# Patient Record
Sex: FEMALE | Race: BLACK OR AFRICAN AMERICAN | URBAN - METROPOLITAN AREA
[De-identification: names, ages, dates, MRNs, and addresses within clinical notes are randomized per-mention and may not be internally consistent; named-entity substitution may affect disease eponyms.]

---

## 2021-07-15 ENCOUNTER — OFFICE VISIT (OUTPATIENT)
Dept: URBAN - METROPOLITAN AREA CLINIC 105 | Facility: CLINIC | Age: 61
End: 2021-07-15
Payer: MEDICARE

## 2021-07-15 ENCOUNTER — WEB ENCOUNTER (OUTPATIENT)
Dept: URBAN - METROPOLITAN AREA CLINIC 105 | Facility: CLINIC | Age: 61
End: 2021-07-15

## 2021-07-15 VITALS
SYSTOLIC BLOOD PRESSURE: 125 MMHG | BODY MASS INDEX: 37.95 KG/M2 | HEIGHT: 63 IN | TEMPERATURE: 97.9 F | HEART RATE: 90 BPM | DIASTOLIC BLOOD PRESSURE: 81 MMHG | WEIGHT: 214.2 LBS

## 2021-07-15 DIAGNOSIS — R93.89 ABNORMAL ULTRASOUND: ICD-10-CM

## 2021-07-15 DIAGNOSIS — R74.01 ELEVATED TRANSAMINASE LEVEL: ICD-10-CM

## 2021-07-15 PROCEDURE — 99214 OFFICE O/P EST MOD 30 MIN: CPT | Performed by: INTERNAL MEDICINE

## 2021-07-15 RX ORDER — POTASSIUM CHLORIDE 1500 MG/1
TABLET, EXTENDED RELEASE ORAL
Qty: 180 | Status: ACTIVE | COMMUNITY

## 2021-07-15 RX ORDER — MECLIZINE HYDROCHLORIDE 12.5 MG/1
TABLET ORAL
Qty: 30 | Status: ACTIVE | COMMUNITY

## 2021-07-15 RX ORDER — HYDROMORPHONE HYDROCHLORIDE 2 MG/1
TABLET ORAL
Qty: 0 | Refills: 0 | Status: ACTIVE | COMMUNITY
Start: 1900-01-01

## 2021-07-15 RX ORDER — ZOLPIDEM TARTRATE 10 MG/1
TABLET ORAL
Qty: 90 | Status: ON HOLD | COMMUNITY

## 2021-07-15 RX ORDER — TOPIRAMATE 50 MG/1
TABLET, FILM COATED ORAL
Qty: 87 | Status: ON HOLD | COMMUNITY

## 2021-07-15 RX ORDER — METHOCARBAMOL TABLETS 500 MG/1
TABLET, COATED ORAL
Qty: 60 | Status: ON HOLD | COMMUNITY

## 2021-07-15 RX ORDER — POTASSIUM CHLORIDE 1.5 G/1.58G
POWDER, FOR SOLUTION ORAL
Qty: 0 | Refills: 0 | Status: ACTIVE | COMMUNITY
Start: 1900-01-01

## 2021-07-15 RX ORDER — NITROFURANTOIN 25; 75 MG/1; MG/1
CAPSULE ORAL
Qty: 6 | Status: ACTIVE | COMMUNITY

## 2021-07-15 RX ORDER — AZITHROMYCIN 250 MG/1
TABLET, FILM COATED ORAL
Qty: 6 | Status: ON HOLD | COMMUNITY

## 2021-07-15 RX ORDER — LISINOPRIL AND HYDROCHLOROTHIAZIDE 25; 20 MG/1; MG/1
TABLET ORAL
Qty: 90 | Status: ACTIVE | COMMUNITY

## 2021-07-15 RX ORDER — TRAMADOL HYDROCHLORIDE 100 MG/1
TABLET, EXTENDED RELEASE ORAL
Qty: 30 | Status: ACTIVE | COMMUNITY

## 2021-07-15 RX ORDER — MELOXICAM 15 MG/1
TABLET ORAL
Qty: 90 | Status: ACTIVE | COMMUNITY

## 2021-07-15 RX ORDER — ALBUTEROL SULFATE 108 UG/1
INHALANT RESPIRATORY (INHALATION)
Qty: 13.4 | Status: ON HOLD | COMMUNITY

## 2021-07-15 RX ORDER — METHYLPREDNISOLONE 4 MG/1
TABLET ORAL
Qty: 21 | Status: ACTIVE | COMMUNITY

## 2021-07-15 RX ORDER — HYDROCODONE BITARTRATE AND ACETAMINOPHEN 7.5; 325 MG/1; MG/1
TABLET ORAL
Qty: 60 | Status: ACTIVE | COMMUNITY

## 2021-07-15 RX ORDER — LISINOPRIL 20 MG/1
1 TABLET TABLET ORAL ONCE A DAY
Status: ACTIVE | COMMUNITY

## 2021-07-15 RX ORDER — TAMOXIFEN CITRATE 20 MG/1
TABLET, FILM COATED ORAL
Qty: 0 | Refills: 0 | Status: ACTIVE | COMMUNITY
Start: 1900-01-01

## 2021-07-15 RX ORDER — LINACLOTIDE 72 UG/1
CAPSULE, GELATIN COATED ORAL
Qty: 90 | Status: ON HOLD | COMMUNITY

## 2021-07-15 RX ORDER — SUVOREXANT 20 MG/1
TABLET, FILM COATED ORAL
Qty: 0 | Refills: 0 | Status: ACTIVE | COMMUNITY
Start: 1900-01-01

## 2021-07-15 RX ORDER — FUROSEMIDE 20 MG/1
TABLET ORAL
Qty: 0 | Refills: 0 | Status: ACTIVE | COMMUNITY
Start: 1900-01-01

## 2021-07-15 RX ORDER — CYCLOBENZAPRINE HYDROCHLORIDE 10 MG/1
TABLET, FILM COATED ORAL
Qty: 90 | Status: ACTIVE | COMMUNITY

## 2021-07-15 NOTE — HPI-TODAY'S VISIT:
The patient presents on follow-up for abnormal imaging. She was previously seen by Charlette Busby MD on 3/12/20.  Today, the patient says she could not get an appt with her established GI, Dr. Charlette Busby, until September. She presents for an abnormal finding on a recent X-ray (done at [facility], [address]). She does not drink alcohol. She started on diet pills 2 weeks ago, but no other new meds. She has been taking vitamin A, D, and C supplementation from when she had COVID, but no other special supplement of drinks. She thought her elevated transaminases were the result of taking Meloxicam BID, tramadol BID, and Loritab BID - now takes Loritab BID while Meloxicam and tramadol use are both 1x/couple weeks. She notes an occasional "squeezing" sensation in the RUQ that occurs 3-4x/month and lasts 5-6 min and is exacerbated by moving in certain positions.   She does not have metal in her body and does not have significant claustrophobia.  Labs 5/11/21 - AST 97, ALT 77. Acute hep panel negative.  4/7/21 - AST 47, ALT 45. 2/2/21 - COVID positive. 2/10/20 - CBC normal except MCV 78.2. HDL 37, triglycerides 280. Hep C Ab negative. CMP, TSH all normal.  9/13/19 - Stool study positive for C. diff (EIA positive). Fecal fat, pancreatic elastase all negative. 9/5/19 - CBC normal except MCV 78, platelets 469. CMP normal except sodium 145, potassium 3.2. Celiac panel negative.

## 2021-07-23 LAB
A/G RATIO: 1.8
ACTIN (SMOOTH MUSCLE) ANTIBODY: 9
ALBUMIN: 4.3
ALKALINE PHOSPHATASE: 102
ALT (SGPT): 34
ANA DIRECT: NEGATIVE
AST (SGOT): 25
BASO (ABSOLUTE): 0.1
BASOS: 1
BILIRUBIN, TOTAL: 0.4
BUN/CREATININE RATIO: 20
BUN: 17
CALCIUM: 9.9
CARBON DIOXIDE, TOTAL: 28
CHLORIDE: 99
CHOLESTEROL, TOTAL: 216
COMMENT:: (no result)
CREATINE KINASE,TOTAL: 114
CREATININE: 0.87
EGFR IF AFRICN AM: 85
EGFR IF NONAFRICN AM: 74
EOS (ABSOLUTE): 0.1
EOS: 1
FERRITIN, SERUM: 209
GLOBULIN, TOTAL: 2.4
GLUCOSE: 125
HBSAG SCREEN: NEGATIVE
HDL CHOLESTEROL: 52
HEMATOCRIT: 39.5
HEMATOLOGY COMMENTS:: (no result)
HEMOGLOBIN: 12.6
HEP A AB, IGM: NEGATIVE
HEP B CORE AB, IGM: NEGATIVE
HEP C VIRUS AB: <0.1
IGG, IMMUNOGLOBULIN G (RDL): 713
IMMATURE CELLS: (no result)
IMMATURE GRANS (ABS): 0
IMMATURE GRANULOCYTES: 0
IMMUNOGLOBULIN A, QN, SERUM: 206
IRON BIND.CAP.(TIBC): 353
IRON SATURATION: 19
IRON: 68
LDL CHOL CALC (NIH): 140
LIVER-KIDNEY MICROSOMAL AB: 0.6
LYMPHS (ABSOLUTE): 3.3
LYMPHS: 38
MCH: 26.5
MCHC: 31.9
MCV: 83
MITOCHONDRIAL (M2) ANTIBODY: <20
MONOCYTES(ABSOLUTE): 0.5
MONOCYTES: 6
NEUTROPHILS (ABSOLUTE): 4.8
NEUTROPHILS: 54
NRBC: (no result)
PLATELETS: 333
POTASSIUM: 3.5
PROTEIN, TOTAL: 6.7
RBC: 4.75
RDW: 14.5
SODIUM: 142
T-TRANSGLUTAMINASE (TTG) IGA: <2
T4,FREE(DIRECT): 1.22
TRIGLYCERIDES: 135
TSH: 1.18
UIBC: 285
VLDL CHOLESTEROL CAL: 24
WBC: 8.9

## 2021-09-02 ENCOUNTER — OFFICE VISIT (OUTPATIENT)
Dept: URBAN - METROPOLITAN AREA CLINIC 17 | Facility: CLINIC | Age: 61
End: 2021-09-02

## 2021-09-02 RX ORDER — MECLIZINE HYDROCHLORIDE 12.5 MG/1
TABLET ORAL
Qty: 30 | Status: ACTIVE | COMMUNITY

## 2021-09-02 RX ORDER — AZITHROMYCIN 250 MG/1
TABLET, FILM COATED ORAL
Qty: 6 | COMMUNITY

## 2021-09-02 RX ORDER — METHYLPREDNISOLONE 4 MG/1
TABLET ORAL
Qty: 21 | Status: ACTIVE | COMMUNITY

## 2021-09-02 RX ORDER — LINACLOTIDE 72 UG/1
CAPSULE, GELATIN COATED ORAL
Qty: 90 | COMMUNITY

## 2021-09-02 RX ORDER — TRAMADOL HYDROCHLORIDE 100 MG/1
TABLET, EXTENDED RELEASE ORAL
Qty: 30 | Status: ACTIVE | COMMUNITY

## 2021-09-02 RX ORDER — ALBUTEROL SULFATE 108 UG/1
INHALANT RESPIRATORY (INHALATION)
Qty: 13.4 | COMMUNITY

## 2021-09-02 RX ORDER — HYDROCODONE BITARTRATE AND ACETAMINOPHEN 7.5; 325 MG/1; MG/1
TABLET ORAL
Qty: 60 | Status: ACTIVE | COMMUNITY

## 2021-09-02 RX ORDER — CYCLOBENZAPRINE HYDROCHLORIDE 10 MG/1
TABLET, FILM COATED ORAL
Qty: 90 | Status: ACTIVE | COMMUNITY

## 2021-09-02 RX ORDER — TAMOXIFEN CITRATE 20 MG/1
TABLET, FILM COATED ORAL
Qty: 0 | Refills: 0 | Status: ACTIVE | COMMUNITY
Start: 1900-01-01

## 2021-09-02 RX ORDER — LISINOPRIL AND HYDROCHLOROTHIAZIDE 25; 20 MG/1; MG/1
TABLET ORAL
Qty: 90 | Status: ACTIVE | COMMUNITY

## 2021-09-02 RX ORDER — FUROSEMIDE 20 MG/1
TABLET ORAL
Qty: 0 | Refills: 0 | Status: ACTIVE | COMMUNITY
Start: 1900-01-01

## 2021-09-02 RX ORDER — NITROFURANTOIN 25; 75 MG/1; MG/1
CAPSULE ORAL
Qty: 6 | Status: ACTIVE | COMMUNITY

## 2021-09-02 RX ORDER — METHOCARBAMOL TABLETS 500 MG/1
TABLET, COATED ORAL
Qty: 60 | COMMUNITY

## 2021-09-02 RX ORDER — POTASSIUM CHLORIDE 1500 MG/1
TABLET, EXTENDED RELEASE ORAL
Qty: 180 | Status: ACTIVE | COMMUNITY

## 2021-09-02 RX ORDER — SUVOREXANT 20 MG/1
TABLET, FILM COATED ORAL
Qty: 0 | Refills: 0 | Status: ACTIVE | COMMUNITY
Start: 1900-01-01

## 2021-09-02 RX ORDER — TOPIRAMATE 50 MG/1
TABLET, FILM COATED ORAL
Qty: 87 | COMMUNITY

## 2021-09-02 RX ORDER — MELOXICAM 15 MG/1
TABLET ORAL
Qty: 90 | Status: ACTIVE | COMMUNITY

## 2021-09-02 RX ORDER — ZOLPIDEM TARTRATE 10 MG/1
TABLET ORAL
Qty: 90 | COMMUNITY

## 2021-09-02 RX ORDER — POTASSIUM CHLORIDE 1.5 G/1.58G
POWDER, FOR SOLUTION ORAL
Qty: 0 | Refills: 0 | Status: ACTIVE | COMMUNITY
Start: 1900-01-01

## 2021-09-02 RX ORDER — LISINOPRIL 20 MG/1
1 TABLET TABLET ORAL ONCE A DAY
Status: ACTIVE | COMMUNITY

## 2021-09-02 RX ORDER — HYDROMORPHONE HYDROCHLORIDE 2 MG/1
TABLET ORAL
Qty: 0 | Refills: 0 | Status: ACTIVE | COMMUNITY
Start: 1900-01-01

## 2022-02-23 ENCOUNTER — DASHBOARD ENCOUNTERS (OUTPATIENT)
Age: 62
End: 2022-02-23

## 2022-02-23 ENCOUNTER — OFFICE VISIT (OUTPATIENT)
Dept: URBAN - METROPOLITAN AREA CLINIC 105 | Facility: CLINIC | Age: 62
End: 2022-02-23
Payer: MEDICARE

## 2022-02-23 VITALS
DIASTOLIC BLOOD PRESSURE: 84 MMHG | HEIGHT: 63 IN | SYSTOLIC BLOOD PRESSURE: 137 MMHG | HEART RATE: 73 BPM | WEIGHT: 225 LBS | TEMPERATURE: 97.4 F | BODY MASS INDEX: 39.87 KG/M2

## 2022-02-23 DIAGNOSIS — R14.0 BLOATING: ICD-10-CM

## 2022-02-23 DIAGNOSIS — R74.01 ELEVATED TRANSAMINASE LEVEL: ICD-10-CM

## 2022-02-23 DIAGNOSIS — R14.3 EXCESSIVE GAS: ICD-10-CM

## 2022-02-23 DIAGNOSIS — R93.89 ABNORMAL ULTRASOUND: ICD-10-CM

## 2022-02-23 PROCEDURE — 99214 OFFICE O/P EST MOD 30 MIN: CPT | Performed by: INTERNAL MEDICINE

## 2022-02-23 RX ORDER — FUROSEMIDE 20 MG/1
TABLET ORAL
Qty: 0 | Refills: 0 | Status: ACTIVE | COMMUNITY
Start: 1900-01-01

## 2022-02-23 RX ORDER — LISINOPRIL AND HYDROCHLOROTHIAZIDE 25; 20 MG/1; MG/1
TABLET ORAL
Qty: 90 | Status: ACTIVE | COMMUNITY

## 2022-02-23 RX ORDER — MELOXICAM 15 MG/1
TABLET ORAL
Qty: 90 | Status: ACTIVE | COMMUNITY

## 2022-02-23 RX ORDER — METHYLPREDNISOLONE 4 MG/1
TABLET ORAL
Qty: 21 | Status: ACTIVE | COMMUNITY

## 2022-02-23 RX ORDER — TAMOXIFEN CITRATE 20 MG/1
TABLET, FILM COATED ORAL
Qty: 0 | Refills: 0 | Status: ACTIVE | COMMUNITY
Start: 1900-01-01

## 2022-02-23 RX ORDER — TRAMADOL HYDROCHLORIDE 100 MG/1
TABLET, EXTENDED RELEASE ORAL
Qty: 30 | Status: ACTIVE | COMMUNITY

## 2022-02-23 RX ORDER — HYDROMORPHONE HYDROCHLORIDE 2 MG/1
TABLET ORAL
Qty: 0 | Refills: 0 | Status: ACTIVE | COMMUNITY
Start: 1900-01-01

## 2022-02-23 RX ORDER — SUVOREXANT 20 MG/1
TABLET, FILM COATED ORAL
Qty: 0 | Refills: 0 | Status: ACTIVE | COMMUNITY
Start: 1900-01-01

## 2022-02-23 RX ORDER — POTASSIUM CHLORIDE 1.5 G/1.58G
POWDER, FOR SOLUTION ORAL
Qty: 0 | Refills: 0 | Status: ACTIVE | COMMUNITY
Start: 1900-01-01

## 2022-02-23 RX ORDER — LISINOPRIL 20 MG/1
1 TABLET TABLET ORAL ONCE A DAY
Status: ACTIVE | COMMUNITY

## 2022-02-23 RX ORDER — MECLIZINE HYDROCHLORIDE 12.5 MG/1
TABLET ORAL
Qty: 30 | Status: ACTIVE | COMMUNITY

## 2022-02-23 RX ORDER — POTASSIUM CHLORIDE 1500 MG/1
TABLET, EXTENDED RELEASE ORAL
Qty: 180 | Status: ACTIVE | COMMUNITY

## 2022-02-23 RX ORDER — MIRTAZAPINE 15 MG/1
1 TABLET AT BEDTIME TABLET, FILM COATED ORAL ONCE A DAY
Status: ACTIVE | COMMUNITY

## 2022-02-23 RX ORDER — CYCLOBENZAPRINE HYDROCHLORIDE 10 MG/1
TABLET, FILM COATED ORAL
Qty: 90 | Status: ACTIVE | COMMUNITY

## 2022-02-23 RX ORDER — HYDROCODONE BITARTRATE AND ACETAMINOPHEN 7.5; 325 MG/1; MG/1
TABLET ORAL
Qty: 60 | Status: ACTIVE | COMMUNITY

## 2022-02-23 RX ORDER — NITROFURANTOIN 25; 75 MG/1; MG/1
CAPSULE ORAL
Qty: 6 | Status: ACTIVE | COMMUNITY

## 2022-02-23 NOTE — HPI-TODAY'S VISIT:
PAST MEDICAL HISTORY:  The patient presents on follow-up for abnormal imaging. She was previously seen by Charlette Busby MD on 3/12/20.  On 7/15/21, the patient said she could not get an appt with her established GI, Dr. Charlette Busby, until September. She presented for an abnormal finding on a recent X-ray (done at [facility], [address]). She did not drink alcohol. She started on diet pills 2 weeks ago, but no other new meds. She had been taking vitamin A, D, and C supplementation from when she had COVID, but no other special supplement of drinks. She thought her elevated transaminases were the result of taking Meloxicam BID, tramadol BID, and Loritab BID - now took Loritab BID while Meloxicam and tramadol use were both 1x/couple weeks. She noted an occasional "squeezing" sensation in the RUQ that occurred 3-4x/month and lasted 5-6 min and was exacerbated by moving in certain positions.   She did not have metal in her body and did not have significant claustrophobia.  HPI:  Today, she says the MRI was done at [place]. Reported complaints are bloating and passage of gas after eating/drinking - passes gas 15-20x/day. She has a BM/2-3 days without straining, but with incomplete evacuation. She has been taking an OTC stool softener for years, 1-2 pills QD. She also notes intermittent, short RLQ pain - feels like the "bowel is tangled up."   Labs 7/15/21 - CMP normal except ALT 34. Ferritin 209, chol 216, . CK, ASMA, AMA, PENNY, acute hep panel, Celiac, LKM all negative. CBC, IgG, iron/TIBC, TSH/FT4 all normal. 5/11/21 - AST 97, ALT 77. Acute hep panel negative.  4/7/21 - AST 47, ALT 45. 2/2/21 - COVID positive. 2/10/20 - CBC normal except MCV 78.2. HDL 37, triglycerides 280. Hep C Ab negative. CMP, TSH all normal.  9/13/19 - Stool study positive for C. diff (EIA positive). Fecal fat, pancreatic elastase all negative. 9/5/19 - CBC normal except MCV 78, platelets 469. CMP normal except sodium 145, potassium 3.2. Celiac panel negative.

## 2022-02-24 LAB
ALBUMIN: 4.6
ALKALINE PHOSPHATASE: 97
ALT (SGPT): 33
AST (SGOT): 22
BILIRUBIN, DIRECT: 0.12
BILIRUBIN, TOTAL: 0.4
PROTEIN, TOTAL: 7.1

## 2022-02-26 PROBLEM — 162864005: Status: ACTIVE | Noted: 2022-02-26

## 2022-02-26 PROBLEM — 197321007: Status: ACTIVE | Noted: 2022-02-26

## 2022-02-26 PROBLEM — 169255008: Status: ACTIVE | Noted: 2021-07-17

## 2022-05-03 ENCOUNTER — OFFICE VISIT (OUTPATIENT)
Dept: URBAN - METROPOLITAN AREA CLINIC 105 | Facility: CLINIC | Age: 62
End: 2022-05-03